# Patient Record
Sex: FEMALE | Race: WHITE
[De-identification: names, ages, dates, MRNs, and addresses within clinical notes are randomized per-mention and may not be internally consistent; named-entity substitution may affect disease eponyms.]

---

## 2017-07-24 ENCOUNTER — HOSPITAL ENCOUNTER (INPATIENT)
Dept: HOSPITAL 68 - SDA | Age: 72
LOS: 2 days | Discharge: HOME HEALTH | DRG: 470 | End: 2017-07-26
Attending: ORTHOPAEDIC SURGERY | Admitting: ORTHOPAEDIC SURGERY
Payer: COMMERCIAL

## 2017-07-24 VITALS — SYSTOLIC BLOOD PRESSURE: 102 MMHG | DIASTOLIC BLOOD PRESSURE: 68 MMHG

## 2017-07-24 VITALS — WEIGHT: 220 LBS | HEIGHT: 63 IN | BODY MASS INDEX: 38.98 KG/M2

## 2017-07-24 VITALS — SYSTOLIC BLOOD PRESSURE: 131 MMHG | DIASTOLIC BLOOD PRESSURE: 82 MMHG

## 2017-07-24 VITALS — SYSTOLIC BLOOD PRESSURE: 118 MMHG | DIASTOLIC BLOOD PRESSURE: 60 MMHG

## 2017-07-24 VITALS — DIASTOLIC BLOOD PRESSURE: 50 MMHG | SYSTOLIC BLOOD PRESSURE: 122 MMHG

## 2017-07-24 DIAGNOSIS — E78.2: ICD-10-CM

## 2017-07-24 DIAGNOSIS — I25.10: ICD-10-CM

## 2017-07-24 DIAGNOSIS — M16.11: Primary | ICD-10-CM

## 2017-07-24 DIAGNOSIS — R11.2: ICD-10-CM

## 2017-07-24 DIAGNOSIS — M25.751: ICD-10-CM

## 2017-07-24 DIAGNOSIS — R51: ICD-10-CM

## 2017-07-24 DIAGNOSIS — E03.9: ICD-10-CM

## 2017-07-24 PROCEDURE — 0SR904A REPLACEMENT OF RIGHT HIP JOINT WITH CERAMIC ON POLYETHYLENE SYNTHETIC SUBSTITUTE, UNCEMENTED, OPEN APPROACH: ICD-10-PCS | Performed by: ORTHOPAEDIC SURGERY

## 2017-07-24 PROCEDURE — 2NBSP: CPT

## 2017-07-24 NOTE — SURG SHORT-STAY <48HRS DIS SUM
Visit Information
 
Visit Dates
Admission Date:
07/24/17
 
Discharge Date:
7/25/17
 
 
Surgical Short Stay DC Summary
Admission Diagnosis:
RIGHT hip primary osteoarthritis
 
Final Diagnosis:
Same,
s/p RIGHT Total hip arthroplasty
 
Procedure(s):
RIGHT Total hip arthroplasty
 
Summary/Significant Findings:
Patient was admitted to the hospital for an elective total joint replacement.  
The procedure was tolerated well and patient was transferred to a general 
surgical floor.  Diet was advanced and tolerated, and the patient voided 
spontaneously.  The patient was evaluated and treated by physical therapy.  At 
the time of hospital discharge, the vital signs were stable, neurovascular 
status was intact, and pain was controlled with the use of oral pain 
medications.
 
Condition at Discharge:
Stable
Discharge Disposition: home health services
Discharge instructions provided to patient/family: Yes
Post discharge follow-up plan:
Follow up with Dr. Borjas in 6 weeks from date of surgery.  Please call his 
office to arrange and/or confirm this appointment.

## 2017-07-24 NOTE — NUR
PT NOTED TO HAVE TRACE GENERALIZED EDEMA AND +1 EDEMA TO RT HAND. IV TO RT
WRIST D/C'D AND NEW IV PLACED. PT HAS ALSO NOT VOIDED SINCE PRIOR TO OR AT
1500. SURGICAL ANDRZEJ RAMOS NOTIFIED AND ORDER PLACED TO GIVE PT HER PO LASIX AT
THIS TIME. PT ASSISTED TO BATHROOM AND WAS ABLE TO VOID >300ML(300 IN HAT AND
ADDITIONAL INTO TOILET). PT ASSISTED BACK TO BED WITHOUT INCIDENT.

## 2017-07-24 NOTE — RADIOLOGY REPORT
EXAMINATION:
XR HIP, RIGHT
 
CLINICAL INFORMATION:
Right hip prosthesis placement.
 
COMPARISON:
None
 
TECHNIQUE:
AP and crosstable lateral views of the right hip.
 
FINDINGS:
A right hip prosthesis is intact without failure or migration. There is
expected subcutaneous gas identified.
 
IMPRESSION:
Intact right hip prosthesis. Expected postoperative appearance.

## 2017-07-24 NOTE — NUR
1416 PATIENT ARRIVED TO THE FLOOR, A+O X3, ON 2LNC, VSS, NO S/O DISTRESS, PAIN
     MANAGED, AMBULATED FROM THE STRETCHER TO THE CHAIR WITH PHYSICAL THERAPY,
     BED LOW, LOCKED, CALL LIGHT IN REACH.

## 2017-07-24 NOTE — ADMISSION CORE MEASURES
Admission Meds
I reviewed the following Meds:
 Current Medications
 
 
  Sig/Kareem Start time  Last
 
Medication Dose  Stop Time Status Admin
 
Atorvastatin Calcium 10 MG DAILY 07/25 1000 UNVr 
 
(Lipitor)     
 
Cefazolin Sodium 2,000 MG ONCE 07/24 0000 NR 
 
(Kefzol-Ancef Inj)   07/24 2359  
 
Furosemide 40 MG DAILY 07/25 1000 UNVr 
 
(Lasix)     
 
Gabapentin 100 MG TID 07/24 1600 UNVr 
 
(Neurontin)     
 
Levothyroxine Sodium 0.1 MG DAILY 07/25 1000 UNVr 
 
(Synthroid)     
 
Lisinopril 10 MG DAILY 07/25 1000 UNVr 
 
(Prinivil)     
 
 
 
 
Acute Coronary Syndrome
 
Inclusion Criteria
ACS Diagnosis No
 
Inpatient Core Measures
LDL Reminder: If No, please order W/I first 24hr of stay
 
Congestive Heart Failure
 
Inclusion Criteria
CHF Diagnosis No
 
Cerebrovascular accident
 
Inclusion Criteria
CVA/TIA Diagnosis No
 
Inpatient Core Measures
Bedside Swallow Eval Reminder: If BSE failed, place ST order
Antithrombotic Reminder: Order Antithrombotic Medication by end of day 2
Antithrombotic Reminder: Document Reason Antithrombotic Not ordered by end of 
day 2
AFIB/Flutter Reminder: If Present, add to problem list
AFIB/Flutter Reminder: Order Anticoag Medication for pts with AFIB/Flutter
Atherosclerosis Reminder: If Present, add to problem list
LDL Reminder: If No, please order W/I first 24hr of stay
PT Order Reminder: If No, please order
 
Venous thromboembolism
 
Inpatient Core Measures
VTE Risk Factors: Surgery
No [facility] prophylaxis d/t No contraindications
No VTE Pharm Prophylaxis d/t No contraindications
 
Inclusion Criteria
- Per Current guidelines, there needs to be overlap
- treatment for the first 5 days of Warfarin therapy.
- Parenteral Anticoagulation (IV or SC) needs to be
- given along with Warfarin therapy.
VTE Diagnosis No
VTE Type NONE
VTE Confirmed by (Test) NONE
 
Problem List
 
As ranked by this Provider
includes Assessment & Plan
 1. Primary osteoarthritis of right hip
 
 
HOME MEDS
Home Med List
Aspirin (Ecotrin*) 81 MG TABLET.   1 TAB PO DAILY PROPHO  (Reported)
Atorvastatin Calcium 10 MG TABLET   1 TAB PO DAILY CHOLESTEROL  (Reported)
Furosemide 40 MG TABLET   1 TAB PO DAILY HTN  (Reported)
Gabapentin 100 MG CAPSULE   1 CAP PO TID BACK PAIN  (Reported)
Levothyroxine Sodium 100 MCG TABLET   1 TAB PO DAILY HYPOTHYROID  (Reported)
Lisinopril/Hydrochlorothiazide (Lisinopril-Hctz 10-12.5 MG Tab) 10 MG-12.5 MG 
TABLET   1 TAB PO DAILY HTN  (Reported)
Methocarbamol (Robaxin-750) 750 MG TABLET   1 TAB PO TID SPASM  (Reported)
Tramadol HCl 50 MG TABLET   1 TAB PO Q6P PRN PAIN  (Reported)

## 2017-07-24 NOTE — PATIENT DISCHARGE INSTRUCTIONS
Discharge Instructions
 
General Discharge Information
You were seen/treated for:
Right hip primary osteoarthritis
 
You had these procedures:
Right Total hip arthroplasty
 
Watch for these problems:
Increasing pain despite the use of pain medication
Increasing redness, warmth or swelling
Drainage of any type from incision
Inability to bear weight on operative leg
Persistent nausea and vomiting
Fever greater than 101.5 degrees
 
Other wound care:
Please keep wound clean and dry.  
No ointments or lotions of any type on or near incision at any time.  No 
exceptions.
Your dressing will be changed by your nurse on the second day after your 
surgery.  Daily dry dressing changes are recommended each day thereafter.  
Do not soak your wound in a bath at any time until otherwise indicated by your 
surgeon.  You may shower, please dry wound immediately after shower with a clean
towel.
 
Special Instructions:
Aspirin:  You are taking this medication to help prevent  blood clot formation. 
Please take with food to protect your stomach lining.   Take as directed.
Constipation:  Pain medication can cause constipation.  It is recommended that 
you take Colace and miralax daily.  You may discontinue this medication if you 
develop loose stool or diarrhea.  If you wish to continue this medication, it is
available over the counter.  If you are unable to move your bowels or pass gas 
after several days,  please contact your doctor.
 
 
Diet
Continue normal diet: Yes
Recommended Diet: Regular
 
Activity
Activity Limited to: Weight bear as tolerated
Additional ACTIVITY Info:
Use assistive devices as needed
 
 
Acute Coronary Syndrome
 
Inclusion Criteria
At DC or during hospital stay patient has or had the following:
 
Discharge Core Measures
Meds if any: Prescribed or Continued at Discharge
Meds if any: NOT Prescribed or Continued at Discharge
 
Congestive Heart Failure
 
Inclusion Criteria
At DC or during hospital stay patient has or had the following:
 
Discharge Core Measures
Meds if any: Prescribed or Continued at Discharge
Meds if any: NOT Prescribed or Continued at Discharge
 
Cerebrovascular accident
 
Inclusion Criteria
At DC or during hospital stay patient has or had the following:
CVA/TIA Diagnosis No
 
Discharge Core Measures
Meds if any: Prescribed or Continued at Discharge
Meds if any: NOT Prescribed or Continued at Discharge
 
Venous thromboembolism
 
Discharge Core Measures
- Per Current guidelines, there needs to be overlap
- treatment for the first 5 days of Warfarin therapy.
- If discharged on Warfarin prior to 5 days of
- overlap therapy, the patient will need to be
- assessed for post discharge needs including
- *Post discharge parental anticoagulation
- *Warfarin and/or parental anticoagulation education
- *Follow up date to check INR post discharge
Meds if any: Prescribed or Continued at Discharge
Note: Overlap Therapy is Warfarin and Anticoagulant
Meds if any: NOT Prescribed or Continued at Discharge

## 2017-07-24 NOTE — OPERATIVE REPORT
Operative/Inv Procedure Report
Surgery Date: 07/24/17
Name of Procedure:
Right total hip replacement
Pre-Operative Diagnosis:
Primary right hip DJD
Post-Operative Diagnosis:
Same
Estimated Blood Loss: 300
Surgeon/Assistant:
LESA KING,CLYDE Villarreal
Anesthesia: block
 
Operative/Procedure Note
Note:
Description of Procedure:
 
The patient was taken to the operating room and positively identified.  After 
induction of spinal anesthesia and administration of appropriate pre-operative 
antibiotics, the patient was positioned supine on the operating room table and 
all bony prominences were well padded.  After performing a surgical timeout, the
right lower extremity was prepped and draped in the usual sterile fashion.
 
A direct anterior approach was made to the right hip.  The incision was carried 
sharply through superficial soft tissues to the level of the fascia.  Meticulous
hemostasis was maintained with Bovie electocautery.  The fascia over the tensor 
fascia jonh muscle was opened sharply and the interval between the TFL and the 
sartorius was entered bluntly taking care to stay lateral to the lateral femoral
cutaneous nerve.  Retractors were placed around the femoral neck and the 
pericapsular fat was identified.  The ascending branches of the lateral femoral 
circumflex vessels were identified and carefully coagulated.  The pericapsular 
fat and anterior capsule were then resected.  A napkin ring osteotomy was 
performed and the femoral head was removed without difficulty.
 
Attention was then turned to the acetabulum.  After appropriate placement of 
retractors, the acetabulum was exposed.  Soft tissue was cleaned from the 
acetabular margin and notch.  Overhanging osteophytes were removed and the 
teardrop was exposed.  The acetabulum was then sequentially reamed to accept a 
56 mm [place] Tritanium hemispherical solid back shell.  This was impacted into 
place in the appropriate position and fitted with a 36 mm Trident X3 zero degree
polyethylene insert.
 
Attention was then turned to the femur.  After performing the appropriate 
ligament releases, the proximal femur was exposed.  It was then sequentially 
broached to accept a size #4 [place] Accolade 2 stem.  This was trialed for leg 
length and stability.  The trial component was removed and the final component 
was impacted into place.  The trunnion was carefully cleaned and fit with a 36 
mm, +0 Biolox delta ceramic femoral head.  The hip was reduced and put through a
full range of motion and found to be stable.
 
The articular space was then irrigated with sterile saline.  The periarticular 
soft tissues were infilitrated with Marcaine.  The fascial layer was closed with
interrupted #1 vicryl suture and the skin was re-approximated with interrupted 2
-0 vicryl.  The skin was closed with a running 3-0 V-Lock suture.  Steri-strips 
and a sterile dressing were applied.  The patient was awakened and taken to the 
recovery room in satisfactory condition.

## 2017-07-24 NOTE — PN- STUDENT
ROBERT DELGADILLO 07/24/17 9976:
Subjective
Subjective:
POST-OP DAY #0 
 
Pt is a 72 y/o female who is POD #0 of an anterior right total hip replacement 
on 7/24 who is doing well with some minor pain with movement in her right hip. 
She has ambulated around the room and is sitting up in the chair. She is 
tolerating clear liquids and is anticipating a solid meal to be delivered. She 
denies nausea, vomiting, or dizziness. She states she has a minor headache 
localized to her right temporal area that began in the PACU that she describes 
as "minor" but has not gotten worse. 
 
ROS
Neuro: Complains of minor headache localized to her right temporal region that 
is intermittent and has not gotten worse. 
Respiratory: Denies any shortness of breath or difficulty breathing. 
Cardiac: Denies any chest pain or palpitations. 
GI: Denies any abdominal pain or cramping, states that she has not passed 
flatulance. 
MSK: Denies any back pain or LE pain. 
 
Objective
Objective:
General: Alert & oriented x3. Appropriate mood & affect. 
Respiratory: CABL. No  wheezes, rhales, or ronchi. 
Cardiac: S1S2. Normal rate, regular rhythm. No murmurs, rubs, or gallops. No 
edema present.
GI: Abdomen is soft, nontender, nondistended. + BS.
MSK: Plantarflexion & dorsiflexion strength 5/5 bilaterally. Incision site 
bandage is c/d/i, with no surrounding erythema. No pain on palpation around the 
incision site. No calf tenderness with palpation bilaterally. Distal nerve 
sensation in tact. Doralis pedis pulses present, 1+. Extremities are dry and 
warm throughout. 
 
Assessment/Plan
Assessment:
This is a 71 year old female who is POD #0 of an anterior right total hip 
replacement on 7/24 who is doing well with some minor pain on movement. 
Plan:
- Continue pain regimen. 
- Aspirin 325 mg DVT prophylaxis. 
- ABX prophylaxis with Ancef for 24 hrs.
- Promote out of bed ambulation and PT. 
- Promote use of incentive spirometer. 
- Will discuss with Dr. Borjas. 
 
VINICIO MANNING 07/24/17 6667:
Assessment/Plan
Plan:
AGREE WITH ABOVE ASSESSMENT AND PLAN.   PATIENT WILL HAVE HOME MEDS RESUMED.  
DIET WILL BE ADVANCED AS TOLERATED AND BOWEL REGIMEN WILL BE SCHEDULED.  
WEIGHTBEARING AS TOLERATED.

## 2017-07-25 VITALS — SYSTOLIC BLOOD PRESSURE: 120 MMHG | DIASTOLIC BLOOD PRESSURE: 60 MMHG

## 2017-07-25 VITALS — DIASTOLIC BLOOD PRESSURE: 54 MMHG | SYSTOLIC BLOOD PRESSURE: 110 MMHG

## 2017-07-25 VITALS — DIASTOLIC BLOOD PRESSURE: 74 MMHG | SYSTOLIC BLOOD PRESSURE: 130 MMHG

## 2017-07-25 VITALS — DIASTOLIC BLOOD PRESSURE: 60 MMHG | SYSTOLIC BLOOD PRESSURE: 120 MMHG

## 2017-07-25 LAB
ABSOLUTE GRANULOCYTE CT: 5.4 /CUMM (ref 1.4–6.5)
BASOPHILS # BLD: 0 /CUMM (ref 0–0.2)
BASOPHILS NFR BLD: 0.2 % (ref 0–2)
EOSINOPHIL # BLD: 0 /CUMM (ref 0–0.7)
EOSINOPHIL NFR BLD: 0.1 % (ref 0–5)
ERYTHROCYTE [DISTWIDTH] IN BLOOD BY AUTOMATED COUNT: 14.1 % (ref 11.5–14.5)
GRANULOCYTES NFR BLD: 69.7 % (ref 42.2–75.2)
HCT VFR BLD CALC: 30.2 % (ref 37–47)
LYMPHOCYTES # BLD: 1.6 /CUMM (ref 1.2–3.4)
MCH RBC QN AUTO: 31.8 PG (ref 27–31)
MCHC RBC AUTO-ENTMCNC: 33.9 G/DL (ref 33–37)
MCV RBC AUTO: 93.9 FL (ref 81–99)
MONOCYTES # BLD: 0.7 /CUMM (ref 0.1–0.6)
PLATELET # BLD: 169 /CUMM (ref 130–400)
PMV BLD AUTO: 10.2 FL (ref 7.4–10.4)
RED BLOOD CELL CT: 3.22 /CUMM (ref 4.2–5.4)
WBC # BLD AUTO: 7.7 /CUMM (ref 4.8–10.8)

## 2017-07-25 NOTE — PN- STUDENT
ELIEROBERT 07/25/17 0737:
Subjective
Subjective:
POST-OP DAY #1
 
Pt is a 71 year old female who is POD #1 of a right anterior total hip 
replacement who complains of nausea and vomiting x2. She states that the nausea 
began this morning around 5am followed by one occurence of vomiting where Zofran
was given and mildly improved, followed by another episode of vomiting. Pt. 
describes the vomiting as contents of food, however has not eaten this morning. 
She states that she has mild pain of her right hip with movement of a 6/10, but 
denies any pain around the incision site. She has ambulated with assistance of 
her walker to and from the bathroom to void. + flatus. 
 
ROS
Neuro: Denies any headaches or vision changes. 
Respiratory: Denies any SOB or difficulty breathing. 
Cardiac: Denies any chest pain or palpitations. 
GI: Denies any abdominal pain or bloating. States that she has not had a BM yet 
but has taken a stool softener, and might be mildly constipated. 
MSK: Denies any joint pain other than her right hip, which is only exacerbated 
with movement and is tolerable while resting. Did not mention any complaints of 
back pain.  
 
Objective
Objective:
Vital Signs
 
 
Date Time Temp Pulse Resp B/P B/P Pulse O2 O2 Flow FiO2
 
     Mean Ox Delivery Rate 
 
07/25 0422 98.7 74 20 120/60  96 Room Air  
 
07/25 0005 98.2 70 20 120/60  94 Room Air  
 
07/24 2000 98.0 96 20 102/68  96   
 
07/24 1830 98.1 85  118/60   Nasal 2.0L 
 
       Cannula  
 
07/24 1650 98.3 94 18 131/82  94 Nasal 2.0L 
 
       Cannula  
 
07/24 1430  82  122/50     
 
07/24 1430      96 Nasal 2.0L 
 
       Cannula  
 
07/24 1420  82 18 122/50  96 Nasal 2.0L 
 
       Cannula  
 
 
Vital Signs
Temperature: 98.7 F
BP: 120/60 mmHg
HR: 70 BPM
RR: 20 breaths/min
Pulse Oxometry: 96% on room air
 
General: Alert & oriented x3. Has an appropraite mood & affect. 
Respiratory: CABL. No wheezes, rhales, or ronchi. No accessory muscle usage. 
Cardiac: S1S2. Normal rate, regular rhythm. No murmurs, rubs, or gallops. No 
edema present. 
GI: Abdomen is soft, nondistended, nontender, + BS in all four quadrants. 
MSK: Incision site is covered w/ a bandage that is c/d/i with no surrounding 
erythema. She has an ice pack resting on the incision site to alleviate pain. 
Dorsiflexion & plantarflexion strength 5/5 bilaterally. No pain elicited with 
palpation of posterior calf muscles. Dorsalis pedis pulses present bilaterally. 
Extremities are warm throughout with intact sensation. + Stocking compression 
devices. 
 
Assessment/Plan
Assessment:
This is a 70 y/o female who is POD #1 of an anterior total hip replacement who 
is complaining of nausea & vomiting x2 occurences begining this morning, and 
some minor pain with movement of right hip of a 6/10.  
Plan:
- Continue to monitor N/V & administer Zofran as needed. 
- Continue pain regimen, possibly switch pain medications to alleviate N/V. 
- Follow bowel regimen, possible suppository if continued constipation with 
stool softeners. 
- Out of bed ambulation & weight bear as tolerated, f/u w/ PT.
- Aspirin 325 mg & SCD devices DVT prophylaxis.
- Promote use of incentive spirometer. 
- Will discuss with attending.  
 
EDWIGE GROVES 07/25/17 1203:
Assessment/Plan
Plan:
agree with above PA-S note
stopped dilaudid. tramadol is a home med for her.
added iv tylenol around the clock
nausea better after zofran and phenergan
bp meds held this morning due to borderline hypotension, but she's currently 
asymptomatic
will divide her home lasix dose into 20mg twice daily instead of 40 daily, as 
she reports her meniere's symptoms are exacerbated when she misses her lasix
continue asa 325mg bid
continue PT
anti-emetics ordered
dressing change tomorrow
family member requesting nutrition consult
will d/w

## 2017-07-25 NOTE — NUR
PT VOMITED 1000ML OF WHAT APPEARS TO BE UNDIGESTED FOOD. N/V CAME ON SUDDENLY.
MEDICATED PT WITH ZOFRAN IV AS PER ORDERS WITH GOOD EFFECT. SURGICAL PA PAGED
TO NOTIFY.

## 2017-07-26 VITALS — SYSTOLIC BLOOD PRESSURE: 140 MMHG | DIASTOLIC BLOOD PRESSURE: 64 MMHG

## 2017-07-26 LAB
ABSOLUTE GRANULOCYTE CT: 3.6 /CUMM (ref 1.4–6.5)
BASOPHILS # BLD: 0 /CUMM (ref 0–0.2)
BASOPHILS NFR BLD: 0.3 % (ref 0–2)
EOSINOPHIL # BLD: 0.1 /CUMM (ref 0–0.7)
EOSINOPHIL NFR BLD: 1.9 % (ref 0–5)
ERYTHROCYTE [DISTWIDTH] IN BLOOD BY AUTOMATED COUNT: 14.3 % (ref 11.5–14.5)
GRANULOCYTES NFR BLD: 53.3 % (ref 42.2–75.2)
HCT VFR BLD CALC: 27 % (ref 37–47)
LYMPHOCYTES # BLD: 2.4 /CUMM (ref 1.2–3.4)
MCH RBC QN AUTO: 31.7 PG (ref 27–31)
MCHC RBC AUTO-ENTMCNC: 33.5 G/DL (ref 33–37)
MCV RBC AUTO: 94.6 FL (ref 81–99)
MONOCYTES # BLD: 0.6 /CUMM (ref 0.1–0.6)
PLATELET # BLD: 139 /CUMM (ref 130–400)
PMV BLD AUTO: 10 FL (ref 7.4–10.4)
RED BLOOD CELL CT: 2.85 /CUMM (ref 4.2–5.4)
WBC # BLD AUTO: 6.7 /CUMM (ref 4.8–10.8)

## 2017-07-26 NOTE — PN- ORTHOPEDIC
Subjective
Subjective:
pain controlled with tramadol.  no n/v, attributes yesterdays events to 
narcotics.  cleared by PT for home.  no cp/sob
 
Objective
Vital Signs and I&Os
Vital Signs
 
 
Date Time Temp Pulse Resp B/P B/P Pulse O2 O2 Flow FiO2
 
     Mean Ox Delivery Rate 
 
07/26 0638 98.3 73 20 140/64  96 Room Air  
 
07/25 2221 98.7 77 20 130/74  96   
 
07/25 1426 98.9 66 20 110/54  93 Room Air  
 
07/25 1254       Room Air  
 
 
 Intake & Output
 
 
 07/26 1600 07/26 0800 07/26 0000 07/25 1600 07/25 0800 07/25 0000
 
Intake Total   100 855
 
Output Total  200  600
 
Balance  -100 -60 900 -1400 255
 
       
 
Intake, IV  100 100 150 100 375
 
Intake, Oral   240 1100  480
 
Number    0  
 
Bowel      
 
Movements      
 
Output,     1000 
 
Emesis      
 
Output, Urine  200 400 350 500 600
 
Patient    220 lb  
 
Weight      
 
 
 
Physical Exam:
GEN: NAD
CARD: s1s2 RRR
PULM: CTAB
ABD: soft nt
EXT: R hip dressing changed, steris with some dried bloody staining, mild 
eccymoses, ttp at incision, calves soft nt bl, +pedal pulses, +plantar/dorsi 
flexion
 
Assessment/Plan
Assessment/Plan
A: POD2 sp R IBRAHIMA, stable
 
P:
OOB, WBAT, PT
prn pain meds
asa for dvt ppx
dc planning
will dw attending
 
 
Core Measures/Miscellaneous
 
Venous Thromboembolism
VTE Risk Factors: Surgery
VTE Contraindications: No Contraindications
VTE Diagnosis: No
VTE Type: NONE
VTE Confirmed by (Test): NONE
 
Beta Blocker
Is Beta Blocker a Home Med? No
 
Antibiotics
Is Patient on Antibiotics? No

## 2018-02-07 ENCOUNTER — HOSPITAL ENCOUNTER (INPATIENT)
Dept: HOSPITAL 68 - 2NB | Age: 73
LOS: 24 days | Discharge: NURSING, REHAB OR LONG-TERM CARE | DRG: 470 | End: 2018-03-03
Attending: ORTHOPAEDIC SURGERY | Admitting: ORTHOPAEDIC SURGERY
Payer: COMMERCIAL

## 2018-02-07 VITALS — HEIGHT: 63 IN | BODY MASS INDEX: 39.87 KG/M2 | WEIGHT: 225 LBS

## 2018-02-07 DIAGNOSIS — E89.0: ICD-10-CM

## 2018-02-07 DIAGNOSIS — H26.9: ICD-10-CM

## 2018-02-07 DIAGNOSIS — Z98.61: ICD-10-CM

## 2018-02-07 DIAGNOSIS — Z79.82: ICD-10-CM

## 2018-02-07 DIAGNOSIS — K86.2: ICD-10-CM

## 2018-02-07 DIAGNOSIS — I25.10: ICD-10-CM

## 2018-02-07 DIAGNOSIS — E78.5: ICD-10-CM

## 2018-02-07 DIAGNOSIS — M16.12: Primary | ICD-10-CM

## 2018-02-07 DIAGNOSIS — M48.061: ICD-10-CM

## 2018-02-07 DIAGNOSIS — Z87.891: ICD-10-CM

## 2018-02-07 DIAGNOSIS — K21.9: ICD-10-CM

## 2018-02-07 DIAGNOSIS — Z88.5: ICD-10-CM

## 2018-02-07 DIAGNOSIS — Z90.710: ICD-10-CM

## 2018-02-07 DIAGNOSIS — I25.2: ICD-10-CM

## 2018-02-07 DIAGNOSIS — E66.09: ICD-10-CM

## 2018-02-07 DIAGNOSIS — K57.92: ICD-10-CM

## 2018-02-07 DIAGNOSIS — I10: ICD-10-CM

## 2018-02-07 PROCEDURE — 2NBSP: CPT

## 2018-02-28 VITALS — SYSTOLIC BLOOD PRESSURE: 120 MMHG | DIASTOLIC BLOOD PRESSURE: 66 MMHG

## 2018-02-28 PROCEDURE — 0SRB03Z REPLACEMENT OF LEFT HIP JOINT WITH CERAMIC SYNTHETIC SUBSTITUTE, OPEN APPROACH: ICD-10-PCS | Performed by: ORTHOPAEDIC SURGERY

## 2018-02-28 NOTE — OPERATIVE REPORT
Operative/Inv Procedure Report
Surgery Date: 02/28/18
Name of Procedure:
Left total hip replacement
Pre-Operative Diagnosis:
Primary left hip DJD
Post-Operative Diagnosis:
Same
Estimated Blood Loss: 300
Surgeon/Assistant:
Suad KING,Phan Villarreal
Anesthesia: block
 
Operative/Procedure Note
Note:
Description of Procedure:
 
The patient was taken to the operating room and positively identified.  After 
induction of spinal anesthesia and administration of appropriate pre-operative 
antibiotics, the patient was positioned supine on the operating room table and 
all bony prominences were well padded.  After performing a surgical timeout, the
left lower extremity was prepped and draped in the usual sterile fashion.
 
A direct anterior approach was made to the left hip.  The incision was carried 
sharply through superficial soft tissues to the level of the fascia.  Meticulous
hemostasis was maintained with Bovie electocautery.  The fascia over the tensor 
fascia jonh muscle was opened sharply and the interval between the TFL and the 
sartorius was entered bluntly taking care to stay lateral to the lateral femoral
cutaneous nerve.  Retractors were placed around the femoral neck and the 
pericapsular fat was identified.  The ascending branches of the lateral femoral 
circumflex vessels were identified and carefully coagulated.  The pericapsular 
fat and anterior capsule were then resected.  A napkin ring osteotomy was 
performed and the femoral head was removed without difficulty.
 
Attention was then turned to the acetabulum.  After appropriate placement of 
retractors, the acetabulum was exposed.  Soft tissue was cleaned from the 
acetabular margin and notch.  Overhanging osteophytes were removed and the 
teardrop was exposed.  The acetabulum was then sequentially reamed to accept a 
52 mm [place] Tritanium hemispherical solid shell.  This was impacted into place
in the appropriate position and fitted with a 32 mm Trident X3 zero degree 
eccentric polyethylene insert.
 
Attention was then turned to the femur.  After performing the appropriate 
ligament releases, the proximal femur was exposed.  It was then sequentially 
broached to accept a size 3 [place] Accolade 2 stem.  A Dall-Miles cable was 
placed prophylactically.  This was trialed for leg length and stability.  The 
trial component was removed and the final component was impacted into place.  
The trunnion was carefully cleaned and fit with a 32 mm, +4 Biolox delta ceramic
femoral head.  The hip was reduced and put through a full range of motion and 
found to be stable.
 
The articular space was then irrigated with sterile saline.  The periarticular 
soft tissues were infilitrated with Marcaine.  The fascial layer was closed with
interrupted #1 vicryl suture and the skin was re-approximated with interrupted 2
-0 vicryl.  The skin was closed with a running 3-0 V-Lock suture.  Steri-strips 
and a sterile dressing were applied.  The patient was awakened and taken to the 
recovery room in satisfactory condition.

## 2018-02-28 NOTE — PN- ORTHOPEDIC
**See Addendum**
Subjective
Subjective:
poc
s/p left kaylynn
 
c/o back pain which is chronic
deneis cp, sob, no n+v with diet
 
Objective
Vital Signs and I&Os
Vital Signs
 
 
Date Time Temp Pulse Resp B/P B/P Pulse O2 O2 Flow FiO2
 
     Mean Ox Delivery Rate 
 
02/28 1840       Room Air Room Air 
 
 
 
Physical Exam:
cv: rrr
lungs: clear
abd: soft, +bs
ext: drsg dry
      onq in place
      distal cms intact
 
Assessment/Plan
Assessment/Plan
ortho stable
 
plan
oob with pt, wbat left le
asa for dvt prophylaxis
resume home meds per emar
home d/c planning
 
 
Core Measures
 
Venous Thromboembolism
VTE Risk Factors Surgery
No Mechanical VTE Prophylaxis d/t N/A MechProphylax Ordered
No VTE Pharm Prophylaxis d/t NA PharmProphylax ordered

## 2018-02-28 NOTE — SURG SHORT-STAY <48HRS DIS SUM
Visit Information
 
Visit Dates
Admission Date:
02/28/18
 
Discharge Date:
03/03/18
 
 
Surgical Short Stay DC Summary
Admission Diagnosis:
Primary osteoarthritis, left hip
Final Diagnosis:
AIDAN s/p left hip replacement
Procedure(s):
Left hip replacement
Summary/Significant Findings:
Patient was admitted to the hospital for an elective left total hip replacement.
 The procedure was tolerated well and patient was transferred to a general 
surgical floor.  Diet was advanced and tolerated and the patient voided 
spontaneously.  The patient was evaluated and treated by physical therapy.  At 
the time of hospital discharge, the vital signs were stable, neurovascular 
status was intact, and pain was controlled with the use of oral pain 
medications.
Condition at Discharge:
Stable
Discharge Disposition: SNF
Discharge instructions provided to patient/family: Yes
Post discharge follow-up plan:
F/u in 6 weeks with Dr. Borjas

## 2018-02-28 NOTE — PATIENT DISCHARGE INSTRUCTIONS
Discharge Instructions
 
General Discharge Information
You were seen/treated for:
Left hip osteoarthritis
You had these procedures:
Left hip arthroplasty on 2/28/18
Watch for these problems:
Increasing pain despite the use of pain medication
Increasing redness, warmth or swelling
Drainage of any type from incision
Inability to bear weight on operative leg
Persistent nausea and vomiting
Fever greater than 101.5 degrees
Other wound care:
Please keep wound clean and dry.  
No ointments or lotions of any type on or near incision.
Your dressing will be changed by your nurse on the second day after your 
surgery.  Daily dry dressing changes are recommended each day thereafter.  
Do not soak your wound- no tub baths/swimming.  You may shower 48hr after 
surgery.
Special Instructions:
Aspirin:  You are taking this medication to help prevent blood clot formation. 
Please take with food to protect your stomach lining.  Please take as directed. 
Please stop taking Aspirin 81 mg, as you are now taking a higher dose, 325 mg 
twice daily.
 
Constipation:  Pain medication can cause constipation.  It is recommended that 
you take Colace and Miralax each day.  Discontinue this medication if you 
develop loose stool or diarrhea.  If you wish to continue this medication, it is
available over the counter.  If you are unable to move your bowels or unable to 
pass gas and are developing bloating, nausea, or vomiting as a result, please 
contact your doctor.
 
 
Diet
Continue normal diet: Yes
 
Activity
Activity Self Limited: Yes
Activity Limited to: Weight bear as tolerated
Other activity limits:
Use rolling walker as needed
 
Acute Coronary Syndrome
 
Inclusion Criteria
At DC or during hospital stay patient has or had the following:
ACS DIAGNOSIS No
 
Discharge Core Measures
Meds if any: Prescribed or Continued at Discharge
Meds if any: NOT Prescribed or Continued at Discharge
 
Congestive Heart Failure
 
Inclusion Criteria
At DC or during hospital stay patient has or had the following:
CHF DIAGNOSIS No
 
Discharge Core Measures
Meds if any: Prescribed or Continued at Discharge
Meds if any: NOT Prescribed or Continued at Discharge
 
Cerebrovascular accident
 
Inclusion Criteria
At DC or during hospital stay patient has or had the following:
CVA/TIA Diagnosis No
 
Discharge Core Measures
Meds if any: Prescribed or Continued at Discharge
Meds if any: NOT Prescribed or Continued at Discharge
 
Venous thromboembolism
 
Inclusion Criteria
VTE Diagnosis No
VTE Type NONE
VTE Confirmed by (Test) NONE
 
Discharge Core Measures
- Per Current guidelines, there needs to be overlap
- treatment for the first 5 days of Warfarin therapy.
- If discharged on Warfarin prior to 5 days of
- overlap therapy, the patient will need to be
- assessed for post discharge needs including
- *Post discharge parental anticoagulation
- *Warfarin and/or parental anticoagulation education
- *Follow up date to check INR post discharge
At least 5 days overlap therapy as Inpatient No
Meds if any: Prescribed or Continued at Discharge
Note: Overlap Therapy is Warfarin and Anticoagulant
Meds if any: NOT Prescribed or Continued at Discharge

## 2018-02-28 NOTE — ADMISSION CORE MEASURES
Acute Coronary Syndrome (CM)
 
ACS Core Measures
Acute Coronary Syndrome Diagnosis No
 
Congestive Heart Failure (NEW)
 
CHF Core Measures
Congestive Heart Failure Diagnosis No
 
Cerebrovascular Accident (NEW)
 
CVA Core Measures
CVA/TIA Diagnosis No
 
Venous Thromboembolism [id number]
 
VTE Core Michelle (View Protocol)
VTE Risk Factors Surgery
No Mechanical VTE Prophylaxis d/t N/A MechProphylax Ordered
No VTE Pharm Prophylaxis d/t NA PharmProphylax ordered
 
Problem List
 
As ranked by this Provider
includes Assessment & Plan
 1. Unilateral primary osteoarthritis, left hip
 
 
HOME MEDS
Home Med List
Aspirin (Ecotrin*) 81 MG TABLET.DR   1 TAB PO DAILY PROPHO  (Reported)
Aspirin (Ecotrin*) 325 MG TABLET.DR   1 TAB PO BID ANTICOAGULATION
Atorvastatin Calcium 10 MG TABLET   1 TAB PO DAILY CHOLESTEROL  (Reported)
Docusate Sodium (Colace) 100 MG CAPSULE   1 CAP PO BID STOOL SOFTENER
Furosemide 40 MG TABLET   1 TAB PO DAILY HTN  (Reported)
Gabapentin 100 MG CAPSULE   1 CAP PO TID BACK PAIN  (Reported)
Levothyroxine Sodium 100 MCG TABLET   1 TAB PO DAILY HYPOTHYROID  (Reported)
Lisinopril/Hydrochlorothiazide (Lisinopril-Hctz 10-12.5 MG Tab) 10 MG-12.5 MG 
TABLET   1 TAB PO DAILY HTN  (Reported)
Methocarbamol (Robaxin-750) 750 MG TABLET   1 TAB PO TID SPASM  (Reported)
Polyethylene Glycol 3350 (Miralax) 17 GRAM POWD.PACK   1 PAC PO DAILY 
CONSTIPATION
Tramadol HCl 50 MG TABLET   1 TAB PO Q6P PRN PAIN  (Reported)
Tramadol HCl (Ultram) 50 MG TABLET   1-2 TAB PO Q4-6 PRN PRN pain control

## 2018-02-28 NOTE — RADIOLOGY REPORT
EXAMINATION:
XR HIP, LEFT
 
CLINICAL INFORMATION:
Left hip replacement.
 
COMPARISON:
None
 
TECHNIQUE:
AP and frog-leg lateral views of the left hip.
 
FINDINGS:
A left hip prosthesis is in place. There is expected subcutaneous gas. There
are no acute fractures.
 
IMPRESSION:
Expected appearance status post left hip replacement.

## 2018-03-01 VITALS — SYSTOLIC BLOOD PRESSURE: 128 MMHG | DIASTOLIC BLOOD PRESSURE: 84 MMHG

## 2018-03-01 VITALS — DIASTOLIC BLOOD PRESSURE: 60 MMHG | SYSTOLIC BLOOD PRESSURE: 100 MMHG

## 2018-03-01 VITALS — DIASTOLIC BLOOD PRESSURE: 58 MMHG | SYSTOLIC BLOOD PRESSURE: 105 MMHG

## 2018-03-01 VITALS — DIASTOLIC BLOOD PRESSURE: 51 MMHG | SYSTOLIC BLOOD PRESSURE: 106 MMHG

## 2018-03-01 VITALS — DIASTOLIC BLOOD PRESSURE: 62 MMHG | SYSTOLIC BLOOD PRESSURE: 118 MMHG

## 2018-03-01 LAB
ABSOLUTE GRANULOCYTE CT: 6.7 /CUMM (ref 1.4–6.5)
BASOPHILS # BLD: 0 /CUMM (ref 0–0.2)
BASOPHILS NFR BLD: 0.1 % (ref 0–2)
EOSINOPHIL # BLD: 0 /CUMM (ref 0–0.7)
EOSINOPHIL NFR BLD: 0 % (ref 0–5)
ERYTHROCYTE [DISTWIDTH] IN BLOOD BY AUTOMATED COUNT: 14.1 % (ref 11.5–14.5)
GRANULOCYTES NFR BLD: 78.1 % (ref 42.2–75.2)
HCT VFR BLD CALC: 28.8 % (ref 37–47)
LYMPHOCYTES # BLD: 1.1 /CUMM (ref 1.2–3.4)
MCH RBC QN AUTO: 32.3 PG (ref 27–31)
MCHC RBC AUTO-ENTMCNC: 34.2 G/DL (ref 33–37)
MCV RBC AUTO: 94.4 FL (ref 81–99)
MONOCYTES # BLD: 0.7 /CUMM (ref 0.1–0.6)
PLATELET # BLD: 193 /CUMM (ref 130–400)
PMV BLD AUTO: 9.4 FL (ref 7.4–10.4)
RED BLOOD CELL CT: 3.05 /CUMM (ref 4.2–5.4)
WBC # BLD AUTO: 8.6 /CUMM (ref 4.8–10.8)

## 2018-03-01 NOTE — PN- ORTHOPEDIC
Subjective
Subjective:
Patient with complaints of moderate pain in the left hip, it is controlled with 
medication.  She has a good appetite.  She denies any fever or flulike illness.
 
Objective
Vital Signs and I&Os
Vital Signs
 
 
Date Time Temp Pulse Resp B/P B/P Pulse O2 O2 Flow FiO2
 
     Mean Ox Delivery Rate 
 
03/01 0728 98.3 57 18 106/51  97 Room Air  
 
03/01 0221 98.1 71 18 105/58  95 Room Air  
 
02/28 2318 98.7 86 20 120/66  93 Room Air  
 
02/28 1840       Room Air Room Air 
 
 
 Intake & Output
 
 
 03/01 1600 03/01 0800 03/01 0000 02/28 1600 02/28 0800 02/28 0000
 
Intake Total  1260 805   
 
Output Total  300 300   
 
Balance  960 505   
 
       
 
Intake, IV  900 325   
 
Intake, Oral  360 480   
 
Output, Urine  300 300   
 
Patient   225 lb   
 
Weight      
 
Weight   Reported by Patient   
 
Measurement      
 
Method      
 
 
 
Physical Exam:
Well-developed well-nourished no apparent distress.
HEENT: Atraumatic, extraocular motion intact
Neck: Supple, no lymphadenopathy
Respiratory: No respiratory distress
Extremities: No edema 
left lower extremity hip dressing in place, 
Dressing clean dry and intact 
Mild thigh swelling No signs of infection. 
No shortening or rotation
Hip range of motion is limited and without unexpected pain
Neurovascularly intact distally
Bilateral calves are supple, nontender.
Neuro: Alert and oriented x3
Psych: Mood affect normal, normal memory normal judgment.
Skin: Warm and dry, no rash on exposed skin
 
Results
Last 48 Hours of Labs:
 Laboratory Tests
 
 
 03/01 0703
 
Chemistry 
 
  Sodium (137 - 145 mmol/L) 141
 
  Potassium (3.5 - 5.1 mmol/L) 3.8
 
  Chloride (98 - 107 mmol/L) 105
 
  Carbon Dioxide (22 - 30 mmol/L) 23
 
  Anion Gap (5 - 16) 13
 
  BUN (7 - 17 mg/dL) 18  H
 
  Creatinine (0.5 - 1.0 mg/dL) 0.7
 
  Estimated GFR (>60 ml/min) > 60
 
  BUN/Creatinine Ratio (7 - 25 %) 25.7  H
 
Hematology 
 
  CBC w Diff NO MAN DIFF REQ
 
  WBC (4.8 - 10.8 /CUMM) 8.6
 
  RBC (4.20 - 5.40 /CUMM) 3.05  L
 
  Hgb (12.0 - 16.0 G/DL) 9.9  L
 
  Hct (37 - 47 %) 28.8  L
 
  MCV (81.0 - 99.0 FL) 94.4
 
  MCH (27.0 - 31.0 PG) 32.3  H
 
  MCHC (33.0 - 37.0 G/DL) 34.2
 
  RDW (11.5 - 14.5 %) 14.1
 
  Plt Count (130 - 400 /CUMM) 193
 
  MPV (7.4 - 10.4 FL) 9.4
 
  Gran % (42.2 - 75.2 %) 78.1  H
 
  Lymphocytes % (20.5 - 51.1 %) 13.3  L
 
  Monocytes % (1.7 - 9.3 %) 8.5
 
  Eosinophils % (0 - 5 %) 0
 
  Basophils % (0.0 - 2.0 %) 0.1
 
  Absolute Granulocytes (1.4 - 6.5 /CUMM) 6.7  H
 
  Absolute Lymphocytes (1.2 - 3.4 /CUMM) 1.1  L
 
  Absolute Monocytes (0.10 - 0.60 /CUMM) 0.7  H
 
  Absolute Eosinophils (0.0 - 0.7 /CUMM) 0
 
  Absolute Basophils (0.0 - 0.2 /CUMM) 0
 
 
 
 
Assessment/Plan
Assessment/Plan
Postop day #1 status post left total hip arthroplasty anterior approach
 
Perioperative antibiotics.
Pain medication as needed.
Out of bed 
Physical therapy, weightbearing as tolerated
DC IV fluids
Regular diet
A.m. labs acceptable
Aspirin for DVT prophylaxis
ALPS for DVT prophylaxis
Regular home meds
Dressing change postop day 2
DC home today if pain control and clears physical therapy, may need another 
night
 
 
Core Measures
 
Venous Thromboembolism
VTE Risk Factors Surgery
No Mechanical VTE Prophylaxis d/t N/A MechProphylax Ordered
No VTE Pharm Prophylaxis d/t NA PharmProphylax ordered

## 2018-03-02 VITALS — DIASTOLIC BLOOD PRESSURE: 60 MMHG | SYSTOLIC BLOOD PRESSURE: 110 MMHG

## 2018-03-02 VITALS — DIASTOLIC BLOOD PRESSURE: 56 MMHG | SYSTOLIC BLOOD PRESSURE: 120 MMHG

## 2018-03-02 VITALS — DIASTOLIC BLOOD PRESSURE: 50 MMHG | SYSTOLIC BLOOD PRESSURE: 110 MMHG

## 2018-03-02 VITALS — DIASTOLIC BLOOD PRESSURE: 58 MMHG | SYSTOLIC BLOOD PRESSURE: 120 MMHG

## 2018-03-02 VITALS — DIASTOLIC BLOOD PRESSURE: 62 MMHG | SYSTOLIC BLOOD PRESSURE: 132 MMHG

## 2018-03-02 VITALS — DIASTOLIC BLOOD PRESSURE: 68 MMHG | SYSTOLIC BLOOD PRESSURE: 138 MMHG

## 2018-03-02 NOTE — PN- ORTHOPEDIC
Subjective
Subjective:
Reports pain improves with dilaudid. Shes asking for oral tylenol. She feels 
unsure about going home today. Denies dizziness. No shortness of breath. No 
chest pains. Voiding well. Passing flatus. No bm yet. 
 
Objective
Vital Signs and I&Os
Vital Signs
 
 
Date Time Temp Pulse Resp B/P B/P Pulse O2 O2 Flow FiO2
 
     Mean Ox Delivery Rate 
 
03/02 0655 99.6 84 20 132/62  95   
 
03/02 0300 99.2 84 20 138/68  95   
 
03/01 2313 99.5 78 18 100/60  97 Room Air  
 
03/01 1905 98.9 68 20 128/84  96   
 
03/01 1523       Room Air Room Air 
 
03/01 1517 98.2 68 18 118/62  95 Room Air  
 
03/01 1305       Room Air Room Air 
 
03/01 0932  56  118/72     
 
 
 Intake & Output
 
 
 03/02 1600 03/02 0800 03/02 0000 03/01 1600 03/01 0800 03/01 0000
 
Intake Total  840  1170 1260 805
 
Output Total  400 350 500 300 300
 
Balance  440 -350 670 960 505
 
       
 
Intake, IV  600  450 900 325
 
Intake, Oral  240  720 360 480
 
Output, Urine  400 350 500 300 300
 
Patient      225 lb
 
Weight      
 
Weight      Reported by Patient
 
Measurement      
 
Method      
 
 
 
Physical Exam:
General - alert & oriented x 3. out of bed to chair. no acute distress.
Lungs - clear bilaterally. no w/r/r.
Cardiac - s1s2. reg.
Abdomen - soft. nontender.
Extremities - warm bilaterally. left hip dressing changed. steris intact with 
few sutures proximally. no erythema or exudates. new dry guaze dressing 
replaced. calves soft and nontender b/l. nvi.
Current Medications:
 Current Medications
 
 
  Sig/Kareem Start time  Last
 
Medication Dose Route Stop Time Status Admin
 
Acetaminophen 500 MG Q4-6 PRN PRN 03/02 0830 AC 
 
  PO   
 
Acetaminophen 1,000 MG .STK-MED ONE 03/01 1422 DC 
 
  IV 03/01 1423  
 
Acetaminophen 1,000 MG Q6 02/28 1800 DC 03/01
 
  IV 03/01 1201  1434
 
Aspirin 325 MG BID 02/28 2200 AC 03/01
 
  PO   2130
 
Atorvastatin Calcium 10 MG 1700 02/28 1700 AC 03/01
 
  PO   1847
 
Bisoprolol Fumarate 10 MG DAILY 02/28 1800 AC 02/28
 
  PO   2227
 
Cholecalciferol 2,000 IU DAILY 03/01 1000 AC 03/01
 
  PO   0932
 
Cyanocobalamin 1,000 MCG DAILY 03/01 1000 AC 03/01
 
  PO   0932
 
Dextrose/Sodium  1,000 ML .[id number] 02/28 1800 DC 03/01
 
Chloride  IV   2130
 
Docusate Sodium 100 MG BID 02/28 2200 AC 03/01
 
  PO   2130
 
Furosemide 40 MG DAILY 03/01 1000 AC 03/01
 
  PO   0932
 
Gabapentin 100 MG TID 02/28 2200 AC 03/01
 
  PO   2131
 
Hydrochlorothiazide 6.25 MG DAILY 03/01 1000 AC 03/01
 
  PO   0932
 
Hydromorphone HCl 2 MG Q4P PRN 02/28 1800 AC 
 
  PO   
 
Hydromorphone HCl 4 MG Q4P PRN 02/28 1800 AC 03/02
 
  PO   0612
 
Levothyroxine Sodium 0.1 MG DAILY AC 03/01 0700 AC 03/02
 
  PO   0612
 
Lisinopril 10 MG DAILY 03/01 1000 AC 03/01
 
  PO   0932
 
Meclizine HCl 12.5 MG ONCE PRN 03/01 2000 DC 
 
  PO 03/01 2359  
 
Melatonin 5 MG AT BEDTIME 02/28 2200 AC 03/01
 
  PO   2131
 
Methocarbamol 750 MG TIDPRN PRN 02/28 1800 AC 03/01
 
  PO   1847
 
Morphine Sulfate 4 MG .STK-MED ONE 03/01 1202 DC 
 
  IM 03/01 1203  
 
Morphine Sulfate 2 MG Q2P PRN 02/28 1800 AC 03/01
 
  IV   1207
 
Omeprazole 20 MG DAILY AC 03/01 0700 AC 03/02
 
  PO   0612
 
Ondansetron HCl 4 MG Q6P PRN 02/28 1800 AC 
 
  IV   
 
Polyethylene Glycol 17 GM DAILY 03/01 1000 AC 03/01
 
  PO   0933
 
Potassium Chloride 10 MEQ DAILY 03/01 1000 AC 03/01
 
  PO   0932
 
Promethazine HCl 12.5 MG Q6P PRN 02/28 1800 AC 
 
  IV 03/07 1344  
 
Temazepam 15 MG AT BEDTIME AS NEED.. 03/01 1145 AC 
 
  PO   
 
 
 
 
Results
Last 48 Hours of Labs:
 Laboratory Tests
 
 
 03/01
 
 0703
 
Chemistry 
 
  Sodium (137 - 145 mmol/L) 141
 
  Potassium (3.5 - 5.1 mmol/L) 3.8
 
  Chloride (98 - 107 mmol/L) 105
 
  Carbon Dioxide (22 - 30 mmol/L) 23
 
  Anion Gap (5 - 16) 13
 
  BUN (7 - 17 mg/dL) 18  H
 
  Creatinine (0.5 - 1.0 mg/dL) 0.7
 
  Estimated GFR (>60 ml/min) > 60
 
  BUN/Creatinine Ratio (7 - 25 %) 25.7  H
 
Hematology 
 
  CBC w Diff NO MAN DIFF REQ
 
  WBC (4.8 - 10.8 /CUMM) 8.6
 
  RBC (4.20 - 5.40 /CUMM) 3.05  L
 
  Hgb (12.0 - 16.0 G/DL) 9.9  L
 
  Hct (37 - 47 %) 28.8  L
 
  MCV (81.0 - 99.0 FL) 94.4
 
  MCH (27.0 - 31.0 PG) 32.3  H
 
  MCHC (33.0 - 37.0 G/DL) 34.2
 
  RDW (11.5 - 14.5 %) 14.1
 
  Plt Count (130 - 400 /CUMM) 193
 
  MPV (7.4 - 10.4 FL) 9.4
 
  Gran % (42.2 - 75.2 %) 78.1  H
 
  Lymphocytes % (20.5 - 51.1 %) 13.3  L
 
  Monocytes % (1.7 - 9.3 %) 8.5
 
  Eosinophils % (0 - 5 %) 0
 
  Basophils % (0.0 - 2.0 %) 0.1
 
  Absolute Granulocytes (1.4 - 6.5 /CUMM) 6.7  H
 
  Absolute Lymphocytes (1.2 - 3.4 /CUMM) 1.1  L
 
  Absolute Monocytes (0.10 - 0.60 /CUMM) 0.7  H
 
  Absolute Eosinophils (0.0 - 0.7 /CUMM) 0
 
  Absolute Basophils (0.0 - 0.2 /CUMM) 0
 
 
 
 
Assessment/Plan
Assessment/Plan
This 72 year old female with hx htn, hypothyroidism, hld, hx cad s/p mi and pci,
chronic back pain, now POD#2 s/p left total hip arthroplasty anterior approach
 
tolerating diet. d/c iv fluids
continue dilaudid for pain control. add oral tylenol
dressing changed
oob with PT
bowel regime ordered
asa bid - dvt ppx
d/c planning, ?home today vs tomorrow
will d/w PT and 
 
 
 
 
Core Measures
 
Venous Thromboembolism
VTE Risk Factors Surgery
No Mechanical VTE Prophylaxis d/t N/A MechProphylax Ordered
No VTE Pharm Prophylaxis d/t NA PharmProphylax ordered
 
Core Measures
 
Venous Thromboembolism
VTE Risk Factors Surgery
No Mechanical VTE Prophylaxis d/t N/A MechProphylax Ordered
No VTE Pharm Prophylaxis d/t NA PharmProphylax ordered

## 2018-03-03 VITALS — SYSTOLIC BLOOD PRESSURE: 128 MMHG | DIASTOLIC BLOOD PRESSURE: 68 MMHG

## 2018-03-03 VITALS — DIASTOLIC BLOOD PRESSURE: 70 MMHG | SYSTOLIC BLOOD PRESSURE: 124 MMHG

## 2018-03-03 VITALS — DIASTOLIC BLOOD PRESSURE: 50 MMHG | SYSTOLIC BLOOD PRESSURE: 102 MMHG

## 2018-03-03 VITALS — SYSTOLIC BLOOD PRESSURE: 124 MMHG | DIASTOLIC BLOOD PRESSURE: 70 MMHG

## 2018-03-03 NOTE — PN- ORTHOPEDIC
Subjective
Subjective:
Pain controlled. Ambulating with PT. Passing gas, no BM. Tolerating a diet. No c
/p, sob, f/c/n/v
 
Objective
Vital Signs and I&Os
Vital Signs
 
 
Date Time Temp Pulse Resp B/P B/P Pulse O2 O2 Flow FiO2
 
     Mean Ox Delivery Rate 
 
03/03 0631 98.0 82 20 128/68  97   
 
03/03 0156 98.0 78 20 102/50  93   
 
03/02 2208 99.8 89 19 110/60  95 Room Air  
 
03/02 1800 100.0 84 19 120/58  93 Room Air  
 
03/02 1416 99.1 81 20 120/56  93 Room Air  
 
03/02 1342  80  116/52     
 
03/02 1229 101.1 83 20 110/50  95 Room Air  
 
 
 Intake & Output
 
 
 03/03 0800 03/03 0000 03/02 1600 03/02 0800 03/02 0000 03/01 1600
 
Intake Total 480 240 945 840  1170
 
Output Total 600  400 400 350 500
 
Balance -120 240 545 440 -350 670
 
       
 
Intake, IV   225 600  450
 
Intake, Oral 480 240 720 240  720
 
Output, Urine 600  400 400 350 500
 
 
 
Physical Exam:
Gen - NAD
Cardiac - S1S2
Lungs - CTAB
Ext - L hip dressing c/d/i, pulses present, motor and sensory intact, teds in 
place, no edema or calf tenderness
Current Medications:
 Current Medications
 
 
  Sig/Kareem Start time  Last
 
Medication Dose Route Stop Time Status Admin
 
Acetaminophen 500 MG Q4-6 PRN PRN 03/02 0830 AC 
 
  PO   
 
Aspirin 325 MG BID 02/28 2200 AC 03/02
 
  PO   2125
 
Atorvastatin Calcium 10 MG 1700 02/28 1700 AC 03/02
 
  PO   1657
 
Bisacodyl 10 MG ONCE ONE 03/03 0715 UNVr 
 
  [place] 03/03 0716  
 
Bisacodyl 10 MG ONCE PRN 03/02 2000 DC 
 
  [place] 03/02 2300  
 
Bisoprolol Fumarate 10 MG DAILY 02/28 1800 AC 03/02
 
  PO   1055
 
Cholecalciferol 2,000 IU DAILY 03/01 1000 AC 03/02
 
  PO   1054
 
Cyanocobalamin 1,000 MCG DAILY 03/01 1000 AC 03/02
 
  PO   1054
 
Dextrose/Sodium  1,000 ML .[id number] 02/28 1800 DC 03/01
 
Chloride  IV   2130
 
Docusate Sodium 100 MG BID 02/28 2200 AC 03/02
 
  PO   2125
 
Furosemide 40 MG DAILY 03/01 1000 AC 03/02
 
  PO   1054
 
Gabapentin 100 MG TID 02/28 2200 AC 03/02
 
  PO   2125
 
Hydrochlorothiazide 6.25 MG DAILY 03/01 1000 AC 03/02
 
  PO   1054
 
Hydromorphone HCl 2 MG Q4P PRN 02/28 1800 AC 03/03
 
  PO   0604
 
Hydromorphone HCl 4 MG Q4P PRN 02/28 1800 AC 03/02
 
  PO   2126
 
Levothyroxine Sodium 0.1 MG DAILY AC 03/01 0700 AC 03/03
 
  PO   0604
 
Lisinopril 10 MG DAILY 03/01 1000 AC 03/02
 
  PO   1342
 
Melatonin 5 MG AT BEDTIME 02/28 2200 AC 03/02
 
  PO   2125
 
Methocarbamol 750 MG TIDPRN PRN 02/28 1800 AC 03/02
 
  PO   1440
 
Morphine Sulfate 2 MG Q2P PRN 02/28 1800 DC 03/01
 
  IV   1207
 
Omeprazole 20 MG DAILY AC 03/01 0700 AC 03/03
 
  PO   0604
 
Ondansetron HCl 4 MG Q6P PRN 02/28 1800 AC 
 
  IV   
 
Patient Medication  1 ED ONE ONE 03/02 1330 DC 
 
Teaching  ED 03/02 1331  
 
Polyethylene Glycol 17 GM DAILY 03/01 1000 AC 03/02
 
  PO   1053
 
Potassium Chloride 10 MEQ DAILY 03/01 1000 AC 03/02
 
  PO   1054
 
Promethazine HCl 12.5 MG Q6P PRN 02/28 1800 AC 
 
  IV 03/07 1344  
 
Temazepam 15 MG AT BEDTIME AS NEED.. 03/01 1145 AC 03/02
 
  PO   2125
 
 
 
 
Assessment/Plan
Assessment/Plan
72 F with hx htn, hypothyroidism, hld, hx cad s/p mi and pci and chronic back 
pain, POD 3 s/p left total hip arthroplasty who is recovering well
 
Cardiac diet
Pain meds prn
Daily dry dressing changes
OOB with PT, WBAT
Bowel regimen on board
Ducolax supp ordered
DVT ppx - asa bid, teds
D/c STR today pending BM
Will d/w 
 
 
 
Core Measures
 
Venous Thromboembolism
VTE Risk Factors Surgery
No Mechanical VTE Prophylaxis d/t N/A MechProphylax Ordered
No VTE Pharm Prophylaxis d/t NA PharmProphylax ordered